# Patient Record
Sex: FEMALE | Race: WHITE | NOT HISPANIC OR LATINO | Employment: FULL TIME | URBAN - METROPOLITAN AREA
[De-identification: names, ages, dates, MRNs, and addresses within clinical notes are randomized per-mention and may not be internally consistent; named-entity substitution may affect disease eponyms.]

---

## 2023-10-05 PROBLEM — M17.0 DEGENERATIVE ARTHRITIS OF KNEE, BILATERAL: Status: ACTIVE | Noted: 2023-10-05

## 2023-10-05 PROBLEM — K21.9 GASTROESOPHAGEAL REFLUX DISEASE: Status: ACTIVE | Noted: 2023-10-05

## 2023-10-05 PROBLEM — E06.3 HASHIMOTO'S THYROIDITIS: Status: ACTIVE | Noted: 2023-10-05

## 2023-10-05 PROBLEM — K21.9 CHRONIC GERD: Status: ACTIVE | Noted: 2023-10-05

## 2023-10-05 PROBLEM — E06.9 THYROIDITIS: Status: ACTIVE | Noted: 2023-10-05

## 2023-10-05 PROBLEM — I82.90 VENOUS THROMBOSIS: Status: ACTIVE | Noted: 2023-10-05

## 2023-10-05 PROBLEM — R06.89 IMPAIRED GAS EXCHANGE: Status: ACTIVE | Noted: 2023-10-05

## 2023-10-05 PROBLEM — I10 BENIGN ESSENTIAL HYPERTENSION: Status: ACTIVE | Noted: 2023-10-05

## 2023-10-05 PROBLEM — E55.9 VITAMIN D DEFICIENCY: Status: ACTIVE | Noted: 2023-10-05

## 2023-10-05 RX ORDER — ASCORBIC ACID 500 MG
TABLET ORAL
COMMUNITY

## 2023-10-05 RX ORDER — ELECTROLYTES/DEXTROSE
SOLUTION, ORAL ORAL
COMMUNITY

## 2023-10-05 RX ORDER — PANTOPRAZOLE SODIUM 40 MG/1
TABLET, DELAYED RELEASE ORAL
COMMUNITY

## 2023-10-05 RX ORDER — FAMOTIDINE 40 MG/1
TABLET, FILM COATED ORAL
COMMUNITY
Start: 2018-02-27

## 2023-10-05 RX ORDER — DEXTROMETHORPHAN HYDROBROMIDE, GUAIFENESIN 5; 100 MG/5ML; MG/5ML
LIQUID ORAL
COMMUNITY

## 2023-10-05 RX ORDER — CALCIUM CARBONATE 500(1250)
TABLET ORAL
COMMUNITY

## 2023-10-05 RX ORDER — IBUPROFEN 100 MG/5ML
SUSPENSION, ORAL (FINAL DOSE FORM) ORAL
COMMUNITY

## 2023-10-05 RX ORDER — CALCIUM CARBONATE/VITAMIN D3 600MG-5MCG
TABLET ORAL
COMMUNITY

## 2023-10-05 RX ORDER — ASPIRIN 81 MG/1
TABLET ORAL
COMMUNITY

## 2023-10-05 RX ORDER — OXYCODONE HYDROCHLORIDE 5 MG/1
TABLET ORAL
COMMUNITY

## 2023-10-05 RX ORDER — ACETAMINOPHEN 500 MG
TABLET ORAL
COMMUNITY

## 2023-10-05 RX ORDER — LISINOPRIL 10 MG/1
TABLET ORAL
COMMUNITY

## 2023-10-05 RX ORDER — MULTIVIT-MIN/FA/LYCOPEN/LUTEIN 800-250MCG
TABLET ORAL
COMMUNITY

## 2023-10-06 ENCOUNTER — OFFICE VISIT (OUTPATIENT)
Dept: PRIMARY CARE | Facility: CLINIC | Age: 63
End: 2023-10-06
Payer: COMMERCIAL

## 2023-10-06 DIAGNOSIS — Z23 IMMUNIZATION DUE: ICD-10-CM

## 2023-10-06 PROCEDURE — 90471 IMMUNIZATION ADMIN: CPT | Performed by: INTERNAL MEDICINE

## 2024-02-06 ENCOUNTER — CLINICAL SUPPORT (OUTPATIENT)
Dept: PRIMARY CARE | Facility: CLINIC | Age: 64
End: 2024-02-06
Payer: COMMERCIAL

## 2024-02-06 DIAGNOSIS — Z23 ENCOUNTER FOR IMMUNIZATION: ICD-10-CM

## 2024-02-06 PROCEDURE — 90471 IMMUNIZATION ADMIN: CPT | Performed by: INTERNAL MEDICINE

## 2024-11-03 ASSESSMENT — PROMIS GLOBAL HEALTH SCALE
RATE_GENERAL_HEALTH: EXCELLENT
RATE_SOCIAL_SATISFACTION: EXCELLENT
CARRYOUT_PHYSICAL_ACTIVITIES: COMPLETELY
CARRYOUT_SOCIAL_ACTIVITIES: EXCELLENT
RATE_MENTAL_HEALTH: EXCELLENT
RATE_PHYSICAL_HEALTH: VERY GOOD
EMOTIONAL_PROBLEMS: NEVER
RATE_AVERAGE_PAIN: 0
RATE_QUALITY_OF_LIFE: EXCELLENT

## 2024-11-04 ENCOUNTER — OFFICE VISIT (OUTPATIENT)
Dept: PRIMARY CARE | Facility: CLINIC | Age: 64
End: 2024-11-04
Payer: COMMERCIAL

## 2024-11-04 VITALS
SYSTOLIC BLOOD PRESSURE: 150 MMHG | DIASTOLIC BLOOD PRESSURE: 74 MMHG | WEIGHT: 175.2 LBS | BODY MASS INDEX: 29.15 KG/M2 | HEART RATE: 78 BPM | OXYGEN SATURATION: 99 %

## 2024-11-04 DIAGNOSIS — E55.9 VITAMIN D DEFICIENCY: ICD-10-CM

## 2024-11-04 DIAGNOSIS — M17.0 PRIMARY OSTEOARTHRITIS OF BOTH KNEES: ICD-10-CM

## 2024-11-04 DIAGNOSIS — Z12.11 ENCOUNTER FOR SCREENING FOR MALIGNANT NEOPLASM OF COLON: ICD-10-CM

## 2024-11-04 DIAGNOSIS — Z78.0 MENOPAUSE: ICD-10-CM

## 2024-11-04 DIAGNOSIS — K21.9 CHRONIC GERD: ICD-10-CM

## 2024-11-04 DIAGNOSIS — I10 BENIGN ESSENTIAL HYPERTENSION: ICD-10-CM

## 2024-11-04 DIAGNOSIS — Z12.31 ENCOUNTER FOR SCREENING MAMMOGRAM FOR BREAST CANCER: ICD-10-CM

## 2024-11-04 DIAGNOSIS — E06.3 HASHIMOTO'S THYROIDITIS: ICD-10-CM

## 2024-11-04 DIAGNOSIS — Z00.00 WELL ADULT EXAM: Primary | ICD-10-CM

## 2024-11-04 PROCEDURE — 99213 OFFICE O/P EST LOW 20 MIN: CPT | Performed by: INTERNAL MEDICINE

## 2024-11-04 PROCEDURE — 3077F SYST BP >= 140 MM HG: CPT | Performed by: INTERNAL MEDICINE

## 2024-11-04 PROCEDURE — 1036F TOBACCO NON-USER: CPT | Performed by: INTERNAL MEDICINE

## 2024-11-04 PROCEDURE — 3078F DIAST BP <80 MM HG: CPT | Performed by: INTERNAL MEDICINE

## 2024-11-04 PROCEDURE — 99396 PREV VISIT EST AGE 40-64: CPT | Performed by: INTERNAL MEDICINE

## 2024-11-04 PROCEDURE — 93000 ELECTROCARDIOGRAM COMPLETE: CPT | Performed by: INTERNAL MEDICINE

## 2024-11-04 RX ORDER — LISINOPRIL 10 MG/1
10 TABLET ORAL DAILY
Qty: 100 TABLET | Refills: 3 | Status: SHIPPED | OUTPATIENT
Start: 2024-11-04 | End: 2025-12-09

## 2024-11-04 ASSESSMENT — ENCOUNTER SYMPTOMS
ACTIVITY CHANGE: 0
NUMBNESS: 0
ARTHRALGIAS: 0
CONSTIPATION: 0
DEPRESSION: 0
CHILLS: 0
LOSS OF SENSATION IN FEET: 0
DYSURIA: 0
OCCASIONAL FEELINGS OF UNSTEADINESS: 0
VOMITING: 0
JOINT SWELLING: 0
DIARRHEA: 0
FEVER: 0
SORE THROAT: 0
HEADACHES: 0
ABDOMINAL PAIN: 0
COLOR CHANGE: 0
CHEST TIGHTNESS: 0
DIZZINESS: 0
NAUSEA: 0
POLYDIPSIA: 0
SHORTNESS OF BREATH: 0
EYE PAIN: 0
FATIGUE: 0
PALPITATIONS: 0
PSYCHIATRIC NEGATIVE: 1
COUGH: 0
FREQUENCY: 0

## 2024-11-04 ASSESSMENT — PAIN SCALES - GENERAL: PAINLEVEL_OUTOF10: 0-NO PAIN

## 2024-11-04 ASSESSMENT — PATIENT HEALTH QUESTIONNAIRE - PHQ9
SUM OF ALL RESPONSES TO PHQ9 QUESTIONS 1 AND 2: 0
1. LITTLE INTEREST OR PLEASURE IN DOING THINGS: NOT AT ALL
2. FEELING DOWN, DEPRESSED OR HOPELESS: NOT AT ALL

## 2024-11-04 NOTE — LETTER
November 25, 2024     Isis Vargas  [address]  [facility] [id number]      Dear Ms. Vargas:    Below are the results from your recent visit:   Cologuard negative-recheck in 3 years     Resulted Orders   Cologuard® colon cancer screening   Result Value Ref Range    NONINV COLON CA DNA+OCC BLD SCRN STL QL Negative Negative      Comment:      NEGATIVE TEST RESULT. A negative Cologuard result indicates a low likelihood that a colorectal cancer (CRC) or advanced adenoma (adenomatous polyps with more advanced pre-malignant features)  is present. The chance that a person with a negative Cologuard test has a colorectal cancer is less than 1 in 1500 (negative predictive value >99.9%) or has an  advanced adenoma is less than  5.3% (negative predictive value 94.7%). These data are based on a prospective cross-sectional study of 10,000 individuals at average risk for colorectal cancer who were screened with both Cologuard and colonoscopy. (Vianey ROSADO et al, N Engl J Med 2014;370(14):5900-2445) The normal value (reference range) for this assay is negative.    COLOGUARD RE-SCREENING RECOMMENDATION: Periodic colorectal cancer screening is an important part of preventive healthcare for asymptomatic individuals at average risk for colorectal cancer.  Following a negative Cologuard result, the American Cancer Society and U.S.  Multi-Society Task Force screening guidelines recommend a Cologuard re-screening interval of 3 years.   References: American Cancer Society Guideline for Colorectal Cancer Screening: https://www.cancer.org/cancer/colon-rectal-cancer/cilrniptl-vmkiforqr-dapxbzc/acs-recommendations.html.; Fernando FUENTES, Elaine BABIN, Luz GARCIAK, Colorectal Cancer Screening: Recommendations for Physicians and Patients from the U.S. Multi-Society Task Force on Colorectal Cancer Screening , Am J Gastroenterology 2017; 112:3688-8474.    TEST DESCRIPTION: Composite algorithmic analysis of stool DNA-biomarkers with hemoglobin  immunoassay.   Quantitative values of individual biomarkers are not reportable and are not associated with individual biomarker result reference ranges. Cologuard is intended for colorectal cancer screening of adults of either sex, 45 years or older, who are at average-risk for colorectal cancer (CRC). Cologuard has been approved for use by the U.S. FDA. The performance of Cologuard was  established in a cross sectional study of average-risk adults aged 50-84. Cologuard performance in patients ages 45 to 49 years was estimated by sub-group analysis of near-age groups. Colonoscopies performed for a positive result may find as the most clinically significant lesion: colorectal cancer [4.0%], advanced adenoma (including sessile serrated polyps greater than or equal to 1cm diameter) [20%] or non- advanced adenoma [31%]; or no colorectal neoplasia [45%]. These estimates are derived from a prospective cross-sectional screening study of 10,000 individuals at average risk for colorectal cancer who were screened with both Cologuard and colonoscopy. (Vianey ROSADO et al, N Engl J Med 2014;370(14):3450-3647.) Cologuard may produce a false negative or false positive result (no colorectal cancer or precancerous polyp present at colonoscopy follow up). A negative Cologuard test result does not guarantee the absence of CRC or advanced adenoma (pre-cancer). The current Cologuard  screening interval is every 3 years. (American Cancer Society and U.S. Multi-Society Task Force). Cologuard performance data in a 10,000 patient pivotal study using colonoscopy as the reference method can be accessed at the following location: www.[employer]/results. Additional description of the Cologuard test process, warnings and precautions can be found at [email or web address].       The test results show that your current treatment is working. Please continue your current medication and plan. We recommend that you repeat the above test(s) in 3  years.    If you have any questions or concerns, please don't hesitate to call.         Sincerely,    Dr. Candice Talamantes and Staff

## 2024-11-04 NOTE — PROGRESS NOTES
"Subjective   Patient ID: Isis Vargas is a 64 y.o. female who presents for CPE.    Hypertension-on no meds at present   BP Readings from Last 3 Encounters:  11/04/24 : 150/74  03/07/23 : 138/78  04/25/22 : 142/86     H/O hashimotos's thyroiditis with nodule--last thyroid US 2022-patient wants to wait til next year--Lab Results       Component                Value               Date                       TSH                      1.25                03/28/2023               Hyperlipidemia-trying to control with lifestyle-Lab Results       Component                Value               Date                       LDLCALC                  144 (H)             03/28/2023            Chronic GERD-taking taking pepcid 40mg and it's working       Vitamin D def on supplements -agrees to DEXA this year    Exercise-none lately-keeps meaning to        Diet -eating \"like crazy\"    Last PAP 2023 wnl; last labs 2023, last mammo 4/2023; never colonoscopy or DEXA            Review of Systems   Constitutional:  Negative for activity change, chills, fatigue and fever.   HENT:  Negative for ear pain, hearing loss and sore throat.    Eyes:  Negative for pain and visual disturbance.   Respiratory:  Negative for cough, chest tightness and shortness of breath.    Cardiovascular:  Negative for chest pain, palpitations and leg swelling.   Gastrointestinal:  Negative for abdominal pain, constipation, diarrhea, nausea and vomiting.   Endocrine: Negative for polydipsia and polyuria.   Genitourinary:  Negative for dysuria and frequency.   Musculoskeletal:  Negative for arthralgias and joint swelling.   Skin:  Negative for color change and rash.   Neurological:  Negative for dizziness, numbness and headaches.   Psychiatric/Behavioral: Negative.         Objective   /74 (BP Location: Left arm, Patient Position: Sitting)   Pulse 78   Wt 79.5 kg (175 lb 3.2 oz)   SpO2 99%   BMI 29.15 kg/m²     Physical Exam  Vitals reviewed.   Constitutional:  "      General: She is not in acute distress.     Appearance: Normal appearance.   HENT:      Right Ear: Tympanic membrane normal. There is no impacted cerumen.      Left Ear: Tympanic membrane normal. There is no impacted cerumen.      Nose: Nose normal.      Mouth/Throat:      Pharynx: Oropharynx is clear.   Eyes:      Extraocular Movements: Extraocular movements intact.      Pupils: Pupils are equal, round, and reactive to light.      Comments: Defer to ophtho    Neck:      Thyroid: No thyroid mass, thyromegaly or thyroid tenderness.      Vascular: No carotid bruit.   Cardiovascular:      Rate and Rhythm: Normal rate and regular rhythm.      Heart sounds: Normal heart sounds. No murmur heard.     No gallop.   Pulmonary:      Breath sounds: Normal breath sounds. No wheezing, rhonchi or rales.   Chest:   Breasts:     Right: Normal. No mass.      Left: Normal. No mass.   Abdominal:      General: Abdomen is flat. Bowel sounds are normal.      Palpations: Abdomen is soft. There is no mass.      Tenderness: There is no abdominal tenderness.   Genitourinary:     Comments: Defer to next year  Musculoskeletal:         General: Normal range of motion.   Lymphadenopathy:      Upper Body:      Right upper body: No axillary adenopathy.      Left upper body: No axillary adenopathy.   Skin:     General: Skin is warm and dry.      Findings: No rash.      Comments: Multiple tatoos   Neurological:      General: No focal deficit present.      Mental Status: She is alert and oriented to person, place, and time.   Psychiatric:         Mood and Affect: Mood normal.         Assessment/Plan  agrees to cologuard but would debate doing colonoscopy if positive; agrees to labs and DEXA, does mammo yearly; declines thyroid US this year; will restart lisinipril as BP trending upward despit nl ECG; discussed adding exercise back in regimenh  Diagnoses and all orders for this visit:  Well adult exam  -     Lipid Panel; Future  Benign essential  hypertension  -     Comprehensive Metabolic Panel; Future  -     lisinopril 10 mg tablet; Take 1 tablet (10 mg) by mouth once daily.  -     ECG 12 Lead  Vitamin D deficiency  Hashimoto's thyroiditis  -     TSH with reflex to Free T4 if abnormal; Future  Chronic GERD  Primary osteoarthritis of both knees  Encounter for screening for malignant neoplasm of colon  -     Cologuard® colon cancer screening; Future  Menopause  -     XR DEXA bone density; Future  Encounter for screening mammogram for breast cancer  -     BI mammo bilateral screening tomosynthesis; Future  Other orders  -     Follow Up In Primary Care - Established; Future      Current Outpatient Medications:     acetaminophen (Tylenol 8 Hour) 650 mg ER tablet, as directed Orally, Disp: , Rfl:     ascorbic acid (Vitamin C) 1,000 mg tablet, 1 tablet Orally Once a day, Disp: , Rfl:     aspirin (João Low Dose Aspirin) 81 mg EC tablet, 1 tablet oral daily; Additional Instructions: begin again after completing apixaban prescription, Disp: , Rfl:     biotin 5 mg capsule, 1 capsule Orally Once a day, Disp: , Rfl:     calcium carbonate-vitamin D3 600 mg-5 mcg (200 unit) tablet, 1 tablet oral daily, Disp: , Rfl:     cholecalciferol (Vitamin D-3) 50 mcg (2,000 unit) capsule, 1 capsule oral daily, Disp: , Rfl:     famotidine (Pepcid) 40 mg tablet, 1 tablet Orally Once a day, Disp: , Rfl:     hyalur ac/chond sul/colg II/AA (HYALURONIC ACID, CHOND-COLLGN, ORAL), 1 capsule oral daily, Disp: , Rfl:     multivit-minerals/folic acid (ONE-A-DAY WOMEN'S 50 PLUS ORAL), 1 tablet oral daily, Disp: , Rfl:     lisinopril 10 mg tablet, Take 1 tablet (10 mg) by mouth once daily., Disp: 100 tablet, Rfl: 3

## 2024-11-05 ENCOUNTER — LAB (OUTPATIENT)
Dept: LAB | Facility: LAB | Age: 64
End: 2024-11-05
Payer: COMMERCIAL

## 2024-11-05 DIAGNOSIS — Z00.00 WELL ADULT EXAM: ICD-10-CM

## 2024-11-05 DIAGNOSIS — I10 BENIGN ESSENTIAL HYPERTENSION: ICD-10-CM

## 2024-11-05 DIAGNOSIS — E06.3 HASHIMOTO'S THYROIDITIS: ICD-10-CM

## 2024-11-05 LAB
ALBUMIN SERPL BCP-MCNC: 4.2 G/DL (ref 3.4–5)
ALP SERPL-CCNC: 89 U/L (ref 33–136)
ALT SERPL W P-5'-P-CCNC: 14 U/L (ref 7–45)
ANION GAP SERPL CALCULATED.3IONS-SCNC: 11 MMOL/L (ref 10–20)
AST SERPL W P-5'-P-CCNC: 17 U/L (ref 9–39)
BILIRUB SERPL-MCNC: 0.7 MG/DL (ref 0–1.2)
BUN SERPL-MCNC: 11 MG/DL (ref 6–23)
CALCIUM SERPL-MCNC: 9.6 MG/DL (ref 8.6–10.3)
CHLORIDE SERPL-SCNC: 106 MMOL/L (ref 98–107)
CHOLEST SERPL-MCNC: 237 MG/DL (ref 0–199)
CHOLEST/HDLC SERPL: 2.7 {RATIO}
CO2 SERPL-SCNC: 28 MMOL/L (ref 21–32)
CREAT SERPL-MCNC: 0.73 MG/DL (ref 0.5–1.05)
EGFRCR SERPLBLD CKD-EPI 2021: >90 ML/MIN/1.73M*2
GLUCOSE SERPL-MCNC: 88 MG/DL (ref 74–99)
HDLC SERPL-MCNC: 88.5 MG/DL
LDLC SERPL CALC-MCNC: 135 MG/DL
NON HDL CHOLESTEROL: 149 MG/DL (ref 0–149)
POTASSIUM SERPL-SCNC: 4.7 MMOL/L (ref 3.5–5.3)
PROT SERPL-MCNC: 6.5 G/DL (ref 6.4–8.2)
SODIUM SERPL-SCNC: 140 MMOL/L (ref 136–145)
TRIGL SERPL-MCNC: 67 MG/DL (ref 0–149)
TSH SERPL-ACNC: 1.5 MIU/L (ref 0.44–3.98)
VLDL: 13 MG/DL (ref 0–40)

## 2024-11-05 PROCEDURE — 80061 LIPID PANEL: CPT

## 2024-11-05 PROCEDURE — 36415 COLL VENOUS BLD VENIPUNCTURE: CPT

## 2024-11-05 PROCEDURE — 84443 ASSAY THYROID STIM HORMONE: CPT

## 2024-11-05 PROCEDURE — 80053 COMPREHEN METABOLIC PANEL: CPT

## 2024-11-05 NOTE — LETTER
November 5, 2024     Isis Vargas  [address]  [facility] [address]      Dear Ms. Vargas:    Below are the results from your recent visit:  Labs stable continue the same   Resulted Orders   Comprehensive Metabolic Panel   Result Value Ref Range    Glucose 88 74 - 99 mg/dL    Sodium 140 136 - 145 mmol/L    Potassium 4.7 3.5 - 5.3 mmol/L    Chloride 106 98 - 107 mmol/L    Bicarbonate 28 21 - 32 mmol/L    Anion Gap 11 10 - 20 mmol/L    Urea Nitrogen 11 6 - 23 mg/dL    Creatinine 0.73 0.50 - 1.05 mg/dL    eGFR >90 >60 mL/min/1.73m*2      Comment:      Calculations of estimated GFR are performed using the 2021 CKD-EPI Study Refit equation without the race variable for the IDMS-Traceable creatinine methods.  https://jasn.asnjournals.org/content/early/2021/09/22/ASN.4467995502    Calcium 9.6 8.6 - 10.3 mg/dL    Albumin 4.2 3.4 - 5.0 g/dL    Alkaline Phosphatase 89 33 - 136 U/L    Total Protein 6.5 6.4 - 8.2 g/dL    AST 17 9 - 39 U/L    Bilirubin, Total 0.7 0.0 - 1.2 mg/dL    ALT 14 7 - 45 U/L      Comment:      Patients treated with Sulfasalazine may generate falsely decreased results for ALT.   Lipid Panel   Result Value Ref Range    Cholesterol 237 (H) 0 - 199 mg/dL      Comment:            Age      Desirable   Borderline High   High     0-19 Y     0 - 169       170 - 199     >/= 200    20-24 Y     0 - 189       190 - 224     >/= 225         >24 Y     0 - 199       200 - 239     >/= 240   **All ranges are based on fasting samples. Specific   therapeutic targets will vary based on patient-specific   cardiac risk.    Pediatric guidelines reference:Pediatrics 2011, 128(S5).Adult guidelines reference: NCEP ATPIII Guidelines,OMKAR 2001, 258:2486-97    Venipuncture immediately after or during the administration of Metamizole may lead to falsely low results. Testing should be performed immediately prior to Metamizole dosing.    HDL-Cholesterol 88.5 mg/dL      Comment:        Age       Very Low   Low     Normal    High     0-19 Y    < 35      < 40     40-45     ----  20-24 Y    ----     < 40      >45      ----        >24 Y      ----     < 40     40-60      >60      Cholesterol/HDL Ratio 2.7       Comment:        Ref Values  Desirable  < 3.4  High Risk  > 5.0    LDL Calculated 135 (H) <=99 mg/dL      Comment:                                  Near   Borderline      AGE      Desirable  Optimal    High     High     Very High     0-19 Y     0 - 109     ---    110-129   >/= 130     ----    20-24 Y     0 - 119     ---    120-159   >/= 160     ----      >24 Y     0 -  99   100-129  130-159   160-189     >/=190      VLDL 13 0 - 40 mg/dL    Triglycerides 67 0 - 149 mg/dL      Comment:      Age              Desirable        Borderline         High        Very High  SEX:B           mg/dL             mg/dL               mg/dL      mg/dL  <=14D                       ----               ----        ----  15D-365D                    ----               ----        ----  1Y-9Y           0-74               75-99             >=100       ----  10Y-19Y        0-89                            >=130       ----  20Y-24Y        0-114             115-149             >=150      ----  >= 25Y         0-149             150-199             200-499    >=500      Venipuncture immediately after or during the administration of Metamizole may lead to falsely low results. Testing should be performed immediately prior to Metamizole dosing.    Non HDL Cholesterol 149 0 - 149 mg/dL      Comment:            Age       Desirable   Borderline High   High     Very High     0-19 Y     0 - 119       120 - 144     >/= 145    >/= 160    20-24 Y     0 - 149       150 - 189     >/= 190      ----         >24 Y    30 mg/dL above LDL Cholesterol goal     TSH with reflex to Free T4 if abnormal   Result Value Ref Range    Thyroid Stimulating Hormone 1.50 0.44 - 3.98 mIU/L    Narrative    TSH testing is performed using different testing methodology at [facility]  [place] than at other system hospitals. Direct result comparisons should only be made within the same method.       The test results show that your current treatment is working. Please continue your current medication and plan.    If you have any questions or concerns, please don't hesitate to call.         Sincerely,        Candice Talamantes M.D. and staff.

## 2024-11-23 LAB — NONINV COLON CA DNA+OCC BLD SCRN STL QL: NEGATIVE

## 2024-12-04 ENCOUNTER — OFFICE VISIT (OUTPATIENT)
Dept: PRIMARY CARE | Facility: CLINIC | Age: 64
End: 2024-12-04
Payer: COMMERCIAL

## 2024-12-04 VITALS
DIASTOLIC BLOOD PRESSURE: 76 MMHG | OXYGEN SATURATION: 93 % | BODY MASS INDEX: 27.32 KG/M2 | WEIGHT: 164.2 LBS | SYSTOLIC BLOOD PRESSURE: 126 MMHG | HEART RATE: 76 BPM

## 2024-12-04 DIAGNOSIS — I10 BENIGN ESSENTIAL HYPERTENSION: Primary | ICD-10-CM

## 2024-12-04 PROCEDURE — 1036F TOBACCO NON-USER: CPT | Performed by: INTERNAL MEDICINE

## 2024-12-04 PROCEDURE — 3074F SYST BP LT 130 MM HG: CPT | Performed by: INTERNAL MEDICINE

## 2024-12-04 PROCEDURE — 3078F DIAST BP <80 MM HG: CPT | Performed by: INTERNAL MEDICINE

## 2024-12-04 PROCEDURE — 99213 OFFICE O/P EST LOW 20 MIN: CPT | Performed by: INTERNAL MEDICINE

## 2024-12-04 ASSESSMENT — PATIENT HEALTH QUESTIONNAIRE - PHQ9
1. LITTLE INTEREST OR PLEASURE IN DOING THINGS: NOT AT ALL
2. FEELING DOWN, DEPRESSED OR HOPELESS: NOT AT ALL
SUM OF ALL RESPONSES TO PHQ9 QUESTIONS 1 AND 2: 0

## 2024-12-04 ASSESSMENT — ENCOUNTER SYMPTOMS
OCCASIONAL FEELINGS OF UNSTEADINESS: 0
LOSS OF SENSATION IN FEET: 0
DEPRESSION: 0

## 2024-12-04 ASSESSMENT — PAIN SCALES - GENERAL: PAINLEVEL_OUTOF10: 0-NO PAIN

## 2024-12-04 NOTE — PROGRESS NOTES
Subjective   Patient ID: Isis Vargas is a 64 y.o. female who presents for 1 month follow up BP.    Tolerating the lisinopril. Cut back on chocolate, salty snacks and bread--has lost weight. Will start [employer] beginning of year    Cologuard negative    Mammo and DEXA end of week             Objective   /76 (BP Location: Left arm, Patient Position: Sitting)   Pulse 76   Wt 74.5 kg (164 lb 3.2 oz)   SpO2 93%   BMI 27.32 kg/m²         Assessment/Plan   Assessment & Plan  Benign essential hypertension  Will continue lisinopril may be able to stop it if resumes exercise

## 2024-12-06 ENCOUNTER — HOSPITAL ENCOUNTER (OUTPATIENT)
Dept: RADIOLOGY | Facility: HOSPITAL | Age: 64
Discharge: HOME | End: 2024-12-06
Payer: COMMERCIAL

## 2024-12-06 ENCOUNTER — APPOINTMENT (OUTPATIENT)
Dept: PRIMARY CARE | Facility: CLINIC | Age: 64
End: 2024-12-06
Payer: COMMERCIAL

## 2024-12-06 VITALS — HEIGHT: 65 IN | BODY MASS INDEX: 27.32 KG/M2 | WEIGHT: 164 LBS

## 2024-12-06 DIAGNOSIS — Z12.31 ENCOUNTER FOR SCREENING MAMMOGRAM FOR BREAST CANCER: ICD-10-CM

## 2024-12-06 DIAGNOSIS — Z78.0 MENOPAUSE: ICD-10-CM

## 2024-12-06 PROCEDURE — 77067 SCR MAMMO BI INCL CAD: CPT

## 2024-12-06 PROCEDURE — 77080 DXA BONE DENSITY AXIAL: CPT

## 2024-12-11 DIAGNOSIS — Z12.31 ENCOUNTER FOR SCREENING MAMMOGRAM FOR BREAST CANCER: ICD-10-CM

## 2025-03-05 ENCOUNTER — OFFICE VISIT (OUTPATIENT)
Dept: PRIMARY CARE | Facility: CLINIC | Age: 65
End: 2025-03-05
Payer: COMMERCIAL

## 2025-03-05 VITALS
WEIGHT: 171.2 LBS | SYSTOLIC BLOOD PRESSURE: 130 MMHG | HEART RATE: 80 BPM | OXYGEN SATURATION: 97 % | DIASTOLIC BLOOD PRESSURE: 74 MMHG | BODY MASS INDEX: 28.49 KG/M2

## 2025-03-05 DIAGNOSIS — M17.0 PRIMARY OSTEOARTHRITIS OF BOTH KNEES: ICD-10-CM

## 2025-03-05 DIAGNOSIS — E06.3 HASHIMOTO'S THYROIDITIS: ICD-10-CM

## 2025-03-05 DIAGNOSIS — I10 BENIGN ESSENTIAL HYPERTENSION: Primary | ICD-10-CM

## 2025-03-05 DIAGNOSIS — K21.9 CHRONIC GERD: ICD-10-CM

## 2025-03-05 DIAGNOSIS — E55.9 VITAMIN D DEFICIENCY: ICD-10-CM

## 2025-03-05 PROCEDURE — 3075F SYST BP GE 130 - 139MM HG: CPT | Performed by: INTERNAL MEDICINE

## 2025-03-05 PROCEDURE — 3078F DIAST BP <80 MM HG: CPT | Performed by: INTERNAL MEDICINE

## 2025-03-05 PROCEDURE — 99214 OFFICE O/P EST MOD 30 MIN: CPT | Performed by: INTERNAL MEDICINE

## 2025-03-05 PROCEDURE — 1036F TOBACCO NON-USER: CPT | Performed by: INTERNAL MEDICINE

## 2025-03-05 RX ORDER — MV/FA/DHA/EPA/FISH OIL/SAW/GNK 400MCG-200
2 COMBINATION PACKAGE (EA) ORAL DAILY
COMMUNITY

## 2025-03-05 ASSESSMENT — ENCOUNTER SYMPTOMS
COUGH: 0
CONSTIPATION: 0
LOSS OF SENSATION IN FEET: 0
CARDIOVASCULAR NEGATIVE: 1
ACTIVITY CHANGE: 0
SHORTNESS OF BREATH: 0
PSYCHIATRIC NEGATIVE: 1
DIARRHEA: 0
DEPRESSION: 0
ABDOMINAL PAIN: 0
CONSTITUTIONAL NEGATIVE: 1
ARTHRALGIAS: 0
DYSURIA: 0
OCCASIONAL FEELINGS OF UNSTEADINESS: 0

## 2025-03-05 ASSESSMENT — PATIENT HEALTH QUESTIONNAIRE - PHQ9
2. FEELING DOWN, DEPRESSED OR HOPELESS: NOT AT ALL
1. LITTLE INTEREST OR PLEASURE IN DOING THINGS: NOT AT ALL
SUM OF ALL RESPONSES TO PHQ9 QUESTIONS 1 AND 2: 0

## 2025-03-05 ASSESSMENT — PAIN SCALES - GENERAL: PAINLEVEL_OUTOF10: 0-NO PAIN

## 2025-03-05 NOTE — PROGRESS NOTES
"Subjective   Patient ID: Isis Vargas is a 64 y.o. female who presents for 3 month follow up .    Hypertension-tolerating lisinopril--  BP Readings from Last 3 Encounters:  03/05/25 : 130/74  12/04/24 : 126/76  11/04/24 : 150/74      H/O hashimotos's thyroiditis with nodule--last thyroid US 2022-patient wants to wait til next year--Lab Results       Component                Value               Date                       TSH                      1.50                11/05/2024               Hyperlipidemia-trying to control with lifestyle-Lab Results       Component                Value               Date                       LDLCALC                  135 (H)             11/05/2024               Chronic GERD-taking taking pepcid 40mg and it's working       Vitamin D def on supplements -agrees to DEXA this year    Exercise-none lately-keeps meaning to        Diet -eating \"like crazy\"                                         Review of Systems   Constitutional: Negative.  Negative for activity change.   Respiratory:  Negative for cough and shortness of breath.    Cardiovascular: Negative.    Gastrointestinal:  Negative for abdominal pain, constipation and diarrhea.   Genitourinary:  Negative for dysuria.   Musculoskeletal:  Negative for arthralgias.   Skin:  Negative for rash.   Psychiatric/Behavioral: Negative.         Objective   /74 (BP Location: Left arm, Patient Position: Sitting)   Pulse 80   Wt 77.7 kg (171 lb 3.2 oz)   SpO2 97%   BMI 28.49 kg/m²     Physical Exam  Vitals reviewed.   Constitutional:       General: She is not in acute distress.     Appearance: Normal appearance.   Cardiovascular:      Rate and Rhythm: Normal rate and regular rhythm.      Heart sounds: Normal heart sounds. No murmur heard.     No gallop.   Pulmonary:      Breath sounds: Normal breath sounds. No wheezing, rhonchi or rales.   Skin:     General: Skin is warm and dry.      Findings: No rash.   Neurological:      General: No " focal deficit present.      Mental Status: She is alert and oriented to person, place, and time.   Psychiatric:         Mood and Affect: Mood normal.         Assessment/Plan  will continue regimen; ordered labs for before CPE  Diagnoses and all orders for this visit:  Benign essential hypertension  -     Comprehensive Metabolic Panel; Future  -     Lipid Panel; Future  Hashimoto's thyroiditis  -     TSH with reflex to Free T4 if abnormal; Future  Vitamin D deficiency  Chronic GERD  Primary osteoarthritis of both knees      Current Outpatient Medications:     acetaminophen (Tylenol 8 Hour) 650 mg ER tablet, as directed Orally, Disp: , Rfl:     ascorbic acid (Vitamin C) 1,000 mg tablet, 1 tablet Orally Once a day, Disp: , Rfl:     aspirin (João Low Dose Aspirin) 81 mg EC tablet, 1 tablet oral daily; Additional Instructions: begin again after completing apixaban prescription, Disp: , Rfl:     biotin 5 mg capsule, 1 capsule Orally Once a day, Disp: , Rfl:     calcium carbonate-vitamin D3 600 mg-5 mcg (200 unit) tablet, 1 tablet oral daily, Disp: , Rfl:     cholecalciferol (Vitamin D-3) 50 mcg (2,000 unit) capsule, 1 capsule oral daily, Disp: , Rfl:     famotidine (Pepcid) 40 mg tablet, 1 tablet Orally Once a day, Disp: , Rfl:     hyalur ac/chond sul/colg II/AA (HYALURONIC ACID, CHOND-COLLGN, ORAL), 1 capsule oral daily, Disp: , Rfl:     krill oil 500 mg capsule, Take 2 capsules (1,000 mg) by mouth early in the morning.., Disp: , Rfl:     lisinopril 10 mg tablet, Take 1 tablet (10 mg) by mouth once daily., Disp: 100 tablet, Rfl: 3    multivit-minerals/folic acid (ONE-A-DAY WOMEN'S 50 PLUS ORAL), 1 tablet oral daily, Disp: , Rfl: